# Patient Record
Sex: MALE | Race: WHITE
[De-identification: names, ages, dates, MRNs, and addresses within clinical notes are randomized per-mention and may not be internally consistent; named-entity substitution may affect disease eponyms.]

---

## 2022-04-28 ENCOUNTER — HOSPITAL ENCOUNTER (EMERGENCY)
Dept: HOSPITAL 12 - ER | Age: 42
Discharge: HOME | End: 2022-04-28
Payer: SELF-PAY

## 2022-04-28 VITALS — WEIGHT: 165 LBS | HEIGHT: 70 IN | BODY MASS INDEX: 23.62 KG/M2

## 2022-04-28 VITALS — DIASTOLIC BLOOD PRESSURE: 63 MMHG | SYSTOLIC BLOOD PRESSURE: 120 MMHG

## 2022-04-28 DIAGNOSIS — R10.13: ICD-10-CM

## 2022-04-28 DIAGNOSIS — R10.11: ICD-10-CM

## 2022-04-28 DIAGNOSIS — K62.5: Primary | ICD-10-CM

## 2022-04-28 LAB
ALP SERPL-CCNC: 86 U/L (ref 50–136)
ALT SERPL W/O P-5'-P-CCNC: 32 U/L (ref 16–63)
AST SERPL-CCNC: 11 U/L (ref 15–37)
BILIRUB DIRECT SERPL-MCNC: 0.1 MG/DL (ref 0–0.2)
BILIRUB SERPL-MCNC: 0.3 MG/DL (ref 0.2–1)
BUN SERPL-MCNC: 19 MG/DL (ref 7–18)
CHLORIDE SERPL-SCNC: 102 MMOL/L (ref 98–107)
CO2 SERPL-SCNC: 30 MMOL/L (ref 21–32)
CREAT SERPL-MCNC: 1.1 MG/DL (ref 0.6–1.3)
GLUCOSE SERPL-MCNC: 115 MG/DL (ref 74–106)
HCT VFR BLD AUTO: 42 % (ref 36.7–47.1)
HEMOCCULT STL QL: NEGATIVE
LIPASE SERPL-CCNC: 137 U/L (ref 73–393)
MCH RBC QN AUTO: 30.5 UUG (ref 23.8–33.4)
MCV RBC AUTO: 87.4 FL (ref 73–96.2)
PLATELET # BLD AUTO: 270 K/UL (ref 152–348)
POTASSIUM SERPL-SCNC: 3.5 MMOL/L (ref 3.5–5.1)
WS STN SPEC: 7.2 G/DL (ref 6.4–8.2)

## 2022-04-28 PROCEDURE — A4663 DIALYSIS BLOOD PRESSURE CUFF: HCPCS

## 2022-04-28 NOTE — NUR
Patient discharged to home in stable condition.  Written and verbal after care 
instructions given. 

Patient verbalizes understanding of instructions. Stressed follow up or return 
to ER for worsening s/s.

pt ambulated with steady gait. denies pain. no SOB. no chest pain. AOx4.

## 2022-04-28 NOTE — NUR
patient walked into ER c/o intermitent right abdominal pain x20 days and rectal 
bleeding this morning. Patient is A/Ox4, no SOB, no distress noted

## 2022-05-11 ENCOUNTER — HOSPITAL ENCOUNTER (EMERGENCY)
Dept: HOSPITAL 12 - ER | Age: 42
Discharge: HOME | End: 2022-05-11
Payer: COMMERCIAL

## 2022-05-11 VITALS — BODY MASS INDEX: 23.62 KG/M2 | HEIGHT: 70 IN | WEIGHT: 165 LBS

## 2022-05-11 VITALS — SYSTOLIC BLOOD PRESSURE: 117 MMHG | DIASTOLIC BLOOD PRESSURE: 77 MMHG

## 2022-05-11 DIAGNOSIS — R10.13: Primary | ICD-10-CM

## 2022-05-11 DIAGNOSIS — K21.9: ICD-10-CM

## 2022-05-11 DIAGNOSIS — M54.9: ICD-10-CM

## 2022-05-11 DIAGNOSIS — R07.9: ICD-10-CM

## 2022-05-11 LAB
ALP SERPL-CCNC: 81 U/L (ref 50–136)
ALT SERPL W/O P-5'-P-CCNC: 44 U/L (ref 16–63)
AST SERPL-CCNC: 23 U/L (ref 15–37)
BILIRUB DIRECT SERPL-MCNC: 0.1 MG/DL (ref 0–0.2)
BILIRUB SERPL-MCNC: 0.4 MG/DL (ref 0.2–1)
BUN SERPL-MCNC: 15 MG/DL (ref 7–18)
CHLORIDE SERPL-SCNC: 103 MMOL/L (ref 98–107)
CO2 SERPL-SCNC: 30 MMOL/L (ref 21–32)
CREAT SERPL-MCNC: 1 MG/DL (ref 0.6–1.3)
GLUCOSE SERPL-MCNC: 117 MG/DL (ref 74–106)
HCT VFR BLD AUTO: 44.6 % (ref 36.7–47.1)
MCH RBC QN AUTO: 31.2 UUG (ref 23.8–33.4)
MCV RBC AUTO: 88.2 FL (ref 73–96.2)
PLATELET # BLD AUTO: 309 K/UL (ref 152–348)
POTASSIUM SERPL-SCNC: 4.1 MMOL/L (ref 3.5–5.1)
WS STN SPEC: 7.2 G/DL (ref 6.4–8.2)

## 2022-05-11 PROCEDURE — A4663 DIALYSIS BLOOD PRESSURE CUFF: HCPCS

## 2022-05-11 NOTE — NUR
patient came to ER c/o epigastric pain, radiating to the right shoulder for 2 
weeks. Patient is A/Ox4, no n/v, no SOB, no distress noted. Patient is 
accompanied by S.O.

## 2022-05-11 NOTE — NUR
Patient discharged to home in stable condition.  Written and verbal after care 
instructions given. 

Patient verbalizes understanding of instructions. Stressed follow up or return 
to ER for worsening s/s. Patient is A/Ox4, no SOB, not in distress. Patient is 
accompanied by S.O.

## 2022-10-19 ENCOUNTER — HOSPITAL ENCOUNTER (EMERGENCY)
Dept: HOSPITAL 12 - ER | Age: 42
Discharge: HOME | End: 2022-10-19
Payer: COMMERCIAL

## 2022-10-19 VITALS — DIASTOLIC BLOOD PRESSURE: 68 MMHG | SYSTOLIC BLOOD PRESSURE: 118 MMHG

## 2022-10-19 VITALS — WEIGHT: 160 LBS | BODY MASS INDEX: 27.31 KG/M2 | HEIGHT: 64 IN

## 2022-10-19 DIAGNOSIS — R07.9: Primary | ICD-10-CM

## 2022-10-19 DIAGNOSIS — K21.9: ICD-10-CM

## 2022-10-19 LAB
BUN SERPL-MCNC: 17 MG/DL (ref 7–18)
CHLORIDE SERPL-SCNC: 105 MMOL/L (ref 98–107)
CO2 SERPL-SCNC: 31 MMOL/L (ref 21–32)
CREAT SERPL-MCNC: 1.1 MG/DL (ref 0.6–1.3)
GLUCOSE SERPL-MCNC: 93 MG/DL (ref 74–106)
HCT VFR BLD AUTO: 43.6 % (ref 36.7–47.1)
MCH RBC QN AUTO: 30.7 UUG (ref 23.8–33.4)
MCV RBC AUTO: 88.4 FL (ref 73–96.2)
PLATELET # BLD AUTO: 295 K/UL (ref 152–348)
POTASSIUM SERPL-SCNC: 4.4 MMOL/L (ref 3.5–5.1)

## 2022-10-19 PROCEDURE — A4663 DIALYSIS BLOOD PRESSURE CUFF: HCPCS

## 2022-10-19 NOTE — NUR
Patient is discharged to home. VSS, afebrile. No s/s of discomfort or distress. 
Provided and explained discharge/ follow up instructions. Verbalized 
understanding. No IV access. All belongings sent with the patinet.